# Patient Record
Sex: MALE | Race: WHITE | NOT HISPANIC OR LATINO | Employment: OTHER | URBAN - METROPOLITAN AREA
[De-identification: names, ages, dates, MRNs, and addresses within clinical notes are randomized per-mention and may not be internally consistent; named-entity substitution may affect disease eponyms.]

---

## 2018-06-22 ENCOUNTER — HOME CARE VISIT (OUTPATIENT)
Dept: HOSPICE | Facility: HOSPICE | Age: 83
End: 2018-06-22
Payer: MEDICARE

## 2018-06-22 ENCOUNTER — HOSPITAL ENCOUNTER (OUTPATIENT)
Dept: RADIOLOGY | Facility: MEDICAL CENTER | Age: 83
End: 2018-06-22

## 2018-06-22 ENCOUNTER — HOSPICE ADMISSION (OUTPATIENT)
Dept: HOSPICE | Facility: HOSPICE | Age: 83
End: 2018-06-22
Payer: MEDICARE

## 2018-06-22 ENCOUNTER — APPOINTMENT (OUTPATIENT)
Dept: RADIOLOGY | Facility: MEDICAL CENTER | Age: 83
DRG: 083 | End: 2018-06-22
Attending: EMERGENCY MEDICINE
Payer: MEDICARE

## 2018-06-22 ENCOUNTER — HOSPITAL ENCOUNTER (INPATIENT)
Facility: MEDICAL CENTER | Age: 83
LOS: 3 days | DRG: 083 | End: 2018-06-25
Attending: EMERGENCY MEDICINE | Admitting: HOSPITALIST
Payer: MEDICARE

## 2018-06-22 DIAGNOSIS — I44.2 COMPLETE HEART BLOCK (HCC): ICD-10-CM

## 2018-06-22 DIAGNOSIS — S22.080A T12 COMPRESSION FRACTURE (HCC): ICD-10-CM

## 2018-06-22 DIAGNOSIS — S06.5XAA SDH (SUBDURAL HEMATOMA) (HCC): ICD-10-CM

## 2018-06-22 DIAGNOSIS — I60.9 SUBARACHNOID HEMORRHAGE (HCC): ICD-10-CM

## 2018-06-22 DIAGNOSIS — S06.5XAA SUBDURAL HEMATOMA (HCC): ICD-10-CM

## 2018-06-22 DIAGNOSIS — I49.9 CARDIAC ARRHYTHMIA, UNSPECIFIED CARDIAC ARRHYTHMIA TYPE: ICD-10-CM

## 2018-06-22 PROBLEM — F02.818 LATE ONSET ALZHEIMER'S DISEASE WITH BEHAVIORAL DISTURBANCE (HCC): Chronic | Status: ACTIVE | Noted: 2018-06-22

## 2018-06-22 PROBLEM — G30.1 LATE ONSET ALZHEIMER'S DISEASE WITH BEHAVIORAL DISTURBANCE (HCC): Status: ACTIVE | Noted: 2018-06-22

## 2018-06-22 PROBLEM — G30.1 LATE ONSET ALZHEIMER'S DISEASE WITH BEHAVIORAL DISTURBANCE (HCC): Chronic | Status: ACTIVE | Noted: 2018-06-22

## 2018-06-22 PROBLEM — D69.6 THROMBOCYTOPENIA (HCC): Status: ACTIVE | Noted: 2018-06-22

## 2018-06-22 PROBLEM — I10 ESSENTIAL HYPERTENSION: Chronic | Status: ACTIVE | Noted: 2018-06-22

## 2018-06-22 PROBLEM — E11.9 CONTROLLED TYPE 2 DIABETES MELLITUS WITHOUT COMPLICATION, WITHOUT LONG-TERM CURRENT USE OF INSULIN (HCC): Chronic | Status: ACTIVE | Noted: 2018-06-22

## 2018-06-22 PROBLEM — F02.818 LATE ONSET ALZHEIMER'S DISEASE WITH BEHAVIORAL DISTURBANCE (HCC): Status: ACTIVE | Noted: 2018-06-22

## 2018-06-22 PROBLEM — I10 ESSENTIAL HYPERTENSION: Status: ACTIVE | Noted: 2018-06-22

## 2018-06-22 LAB
ABO GROUP BLD: NORMAL
ABO GROUP BLD: NORMAL
ALBUMIN SERPL BCP-MCNC: 3.9 G/DL (ref 3.2–4.9)
ALBUMIN/GLOB SERPL: 1.5 G/DL
ALP SERPL-CCNC: 61 U/L (ref 30–99)
ALT SERPL-CCNC: 16 U/L (ref 2–50)
ANION GAP SERPL CALC-SCNC: 11 MMOL/L (ref 0–11.9)
APTT PPP: 31.5 SEC (ref 24.7–36)
AST SERPL-CCNC: 28 U/L (ref 12–45)
BARCODED ABORH UBTYP: 5100
BARCODED PRD CODE UBPRD: NORMAL
BARCODED UNIT NUM UBUNT: NORMAL
BILIRUB SERPL-MCNC: 0.8 MG/DL (ref 0.1–1.5)
BLD GP AB SCN SERPL QL: NORMAL
BUN SERPL-MCNC: 44 MG/DL (ref 8–22)
CALCIUM SERPL-MCNC: 8.9 MG/DL (ref 8.5–10.5)
CFT BLD TEG: 4.6 MIN (ref 5–10)
CHLORIDE SERPL-SCNC: 107 MMOL/L (ref 96–112)
CLOT ANGLE BLD TEG: 66.9 DEGREES (ref 53–72)
CLOT LYSIS 30M P MA LENFR BLD TEG: 0 % (ref 0–8)
CO2 SERPL-SCNC: 18 MMOL/L (ref 20–33)
COMPONENT P 8504P: NORMAL
CREAT SERPL-MCNC: 2.91 MG/DL (ref 0.5–1.4)
CT.EXTRINSIC BLD ROTEM: 1.7 MIN (ref 1–3)
ERYTHROCYTE [DISTWIDTH] IN BLOOD BY AUTOMATED COUNT: 43.4 FL (ref 35.9–50)
ETHANOL BLD-MCNC: 0 G/DL
GLOBULIN SER CALC-MCNC: 2.6 G/DL (ref 1.9–3.5)
GLUCOSE BLD-MCNC: 252 MG/DL (ref 65–99)
GLUCOSE SERPL-MCNC: 286 MG/DL (ref 65–99)
HCT VFR BLD AUTO: 32.4 % (ref 42–52)
HGB BLD-MCNC: 10.9 G/DL (ref 14–18)
INR PPP: 1.15 (ref 0.87–1.13)
LACTATE BLD-SCNC: 1.3 MMOL/L (ref 0.5–2)
MCF BLD TEG: 63.7 MM (ref 50–70)
MCH RBC QN AUTO: 31.2 PG (ref 27–33)
MCHC RBC AUTO-ENTMCNC: 33.6 G/DL (ref 33.7–35.3)
MCV RBC AUTO: 92.8 FL (ref 81.4–97.8)
PA AA BLD-ACNC: 80.2 %
PA ADP BLD-ACNC: 48.5 %
PLATELET # BLD AUTO: 121 K/UL (ref 164–446)
PMV BLD AUTO: 10.2 FL (ref 9–12.9)
POTASSIUM SERPL-SCNC: 5.4 MMOL/L (ref 3.6–5.5)
PRODUCT TYPE UPROD: NORMAL
PROT SERPL-MCNC: 6.5 G/DL (ref 6–8.2)
PROTHROMBIN TIME: 14.4 SEC (ref 12–14.6)
RBC # BLD AUTO: 3.49 M/UL (ref 4.7–6.1)
RH BLD: NORMAL
RH BLD: NORMAL
SODIUM SERPL-SCNC: 136 MMOL/L (ref 135–145)
TEG ALGORITHM TGALG: ABNORMAL
TROPONIN I SERPL-MCNC: 0.19 NG/ML (ref 0–0.04)
UNIT STATUS USTAT: NORMAL
WBC # BLD AUTO: 9.1 K/UL (ref 4.8–10.8)

## 2018-06-22 PROCEDURE — 700105 HCHG RX REV CODE 258: Performed by: EMERGENCY MEDICINE

## 2018-06-22 PROCEDURE — 85610 PROTHROMBIN TIME: CPT

## 2018-06-22 PROCEDURE — 86900 BLOOD TYPING SEROLOGIC ABO: CPT

## 2018-06-22 PROCEDURE — 700111 HCHG RX REV CODE 636 W/ 250 OVERRIDE (IP): Performed by: NEUROLOGICAL SURGERY

## 2018-06-22 PROCEDURE — 82962 GLUCOSE BLOOD TEST: CPT

## 2018-06-22 PROCEDURE — 99291 CRITICAL CARE FIRST HOUR: CPT

## 2018-06-22 PROCEDURE — 70450 CT HEAD/BRAIN W/O DYE: CPT

## 2018-06-22 PROCEDURE — 72131 CT LUMBAR SPINE W/O DYE: CPT

## 2018-06-22 PROCEDURE — 80307 DRUG TEST PRSMV CHEM ANLYZR: CPT

## 2018-06-22 PROCEDURE — 770022 HCHG ROOM/CARE - ICU (200)

## 2018-06-22 PROCEDURE — 85384 FIBRINOGEN ACTIVITY: CPT

## 2018-06-22 PROCEDURE — 85027 COMPLETE CBC AUTOMATED: CPT

## 2018-06-22 PROCEDURE — 700105 HCHG RX REV CODE 258: Performed by: HOSPITALIST

## 2018-06-22 PROCEDURE — 85730 THROMBOPLASTIN TIME PARTIAL: CPT

## 2018-06-22 PROCEDURE — 700111 HCHG RX REV CODE 636 W/ 250 OVERRIDE (IP): Performed by: HOSPITALIST

## 2018-06-22 PROCEDURE — P9034 PLATELETS, PHERESIS: HCPCS

## 2018-06-22 PROCEDURE — 86850 RBC ANTIBODY SCREEN: CPT

## 2018-06-22 PROCEDURE — 73070 X-RAY EXAM OF ELBOW: CPT | Mod: LT

## 2018-06-22 PROCEDURE — 85576 BLOOD PLATELET AGGREGATION: CPT | Mod: 91

## 2018-06-22 PROCEDURE — 83605 ASSAY OF LACTIC ACID: CPT

## 2018-06-22 PROCEDURE — G0390 TRAUMA RESPONS W/HOSP CRITI: HCPCS

## 2018-06-22 PROCEDURE — 36430 TRANSFUSION BLD/BLD COMPNT: CPT

## 2018-06-22 PROCEDURE — 99223 1ST HOSP IP/OBS HIGH 75: CPT | Performed by: HOSPITALIST

## 2018-06-22 PROCEDURE — 72128 CT CHEST SPINE W/O DYE: CPT

## 2018-06-22 PROCEDURE — 80053 COMPREHEN METABOLIC PANEL: CPT

## 2018-06-22 PROCEDURE — 71045 X-RAY EXAM CHEST 1 VIEW: CPT

## 2018-06-22 PROCEDURE — 85347 COAGULATION TIME ACTIVATED: CPT

## 2018-06-22 PROCEDURE — 93005 ELECTROCARDIOGRAM TRACING: CPT | Performed by: HOSPITALIST

## 2018-06-22 PROCEDURE — 86901 BLOOD TYPING SEROLOGIC RH(D): CPT

## 2018-06-22 PROCEDURE — A9270 NON-COVERED ITEM OR SERVICE: HCPCS | Performed by: NEUROLOGICAL SURGERY

## 2018-06-22 PROCEDURE — 93010 ELECTROCARDIOGRAM REPORT: CPT | Performed by: INTERNAL MEDICINE

## 2018-06-22 PROCEDURE — 6A550Z2 PHERESIS OF PLATELETS, SINGLE: ICD-10-PCS | Performed by: EMERGENCY MEDICINE

## 2018-06-22 PROCEDURE — 84484 ASSAY OF TROPONIN QUANT: CPT

## 2018-06-22 PROCEDURE — 700102 HCHG RX REV CODE 250 W/ 637 OVERRIDE(OP): Performed by: NEUROLOGICAL SURGERY

## 2018-06-22 PROCEDURE — 700102 HCHG RX REV CODE 250 W/ 637 OVERRIDE(OP): Performed by: HOSPITALIST

## 2018-06-22 RX ORDER — ENEMA 19; 7 G/133ML; G/133ML
1 ENEMA RECTAL
Status: DISCONTINUED | OUTPATIENT
Start: 2018-06-22 | End: 2018-06-22

## 2018-06-22 RX ORDER — SODIUM CHLORIDE 9 MG/ML
INJECTION, SOLUTION INTRAVENOUS CONTINUOUS
Status: DISCONTINUED | OUTPATIENT
Start: 2018-06-22 | End: 2018-06-22

## 2018-06-22 RX ORDER — GLIMEPIRIDE 2 MG/1
2 TABLET ORAL EVERY MORNING
COMMUNITY

## 2018-06-22 RX ORDER — ONDANSETRON 2 MG/ML
4 INJECTION INTRAMUSCULAR; INTRAVENOUS EVERY 4 HOURS PRN
Status: DISCONTINUED | OUTPATIENT
Start: 2018-06-22 | End: 2018-06-25 | Stop reason: HOSPADM

## 2018-06-22 RX ORDER — LORAZEPAM 2 MG/ML
4 INJECTION INTRAMUSCULAR
Status: DISCONTINUED | OUTPATIENT
Start: 2018-06-22 | End: 2018-06-25 | Stop reason: HOSPADM

## 2018-06-22 RX ORDER — CLONIDINE HYDROCHLORIDE 0.1 MG/1
0.1 TABLET ORAL EVERY 4 HOURS PRN
Status: DISCONTINUED | OUTPATIENT
Start: 2018-06-22 | End: 2018-06-22

## 2018-06-22 RX ORDER — HALOPERIDOL 5 MG/ML
2-5 INJECTION INTRAMUSCULAR EVERY 4 HOURS PRN
Status: DISCONTINUED | OUTPATIENT
Start: 2018-06-22 | End: 2018-06-25 | Stop reason: HOSPADM

## 2018-06-22 RX ORDER — SCOLOPAMINE TRANSDERMAL SYSTEM 1 MG/1
1 PATCH, EXTENDED RELEASE TRANSDERMAL
Status: DISCONTINUED | OUTPATIENT
Start: 2018-06-22 | End: 2018-06-22

## 2018-06-22 RX ORDER — OXYCODONE HYDROCHLORIDE 5 MG/1
2.5 TABLET ORAL
Status: DISCONTINUED | OUTPATIENT
Start: 2018-06-22 | End: 2018-06-25 | Stop reason: HOSPADM

## 2018-06-22 RX ORDER — HYDRALAZINE HYDROCHLORIDE 20 MG/ML
10 INJECTION INTRAMUSCULAR; INTRAVENOUS
Status: DISCONTINUED | OUTPATIENT
Start: 2018-06-22 | End: 2018-06-22

## 2018-06-22 RX ORDER — LEVETIRACETAM 500 MG/1
500 TABLET ORAL EVERY 12 HOURS
Status: DISCONTINUED | OUTPATIENT
Start: 2018-06-22 | End: 2018-06-25 | Stop reason: HOSPADM

## 2018-06-22 RX ORDER — MORPHINE SULFATE 4 MG/ML
4 INJECTION, SOLUTION INTRAMUSCULAR; INTRAVENOUS
Status: DISCONTINUED | OUTPATIENT
Start: 2018-06-22 | End: 2018-06-25 | Stop reason: HOSPADM

## 2018-06-22 RX ORDER — POLYETHYLENE GLYCOL 3350 17 G/17G
1 POWDER, FOR SOLUTION ORAL
Status: DISCONTINUED | OUTPATIENT
Start: 2018-06-22 | End: 2018-06-22

## 2018-06-22 RX ORDER — BISACODYL 10 MG
10 SUPPOSITORY, RECTAL RECTAL
Status: DISCONTINUED | OUTPATIENT
Start: 2018-06-22 | End: 2018-06-22

## 2018-06-22 RX ORDER — DIPHENHYDRAMINE HYDROCHLORIDE 50 MG/ML
25 INJECTION INTRAMUSCULAR; INTRAVENOUS EVERY 6 HOURS PRN
Status: DISCONTINUED | OUTPATIENT
Start: 2018-06-22 | End: 2018-06-25 | Stop reason: HOSPADM

## 2018-06-22 RX ORDER — AMOXICILLIN 250 MG
1 CAPSULE ORAL NIGHTLY
Status: DISCONTINUED | OUTPATIENT
Start: 2018-06-22 | End: 2018-06-22

## 2018-06-22 RX ORDER — LABETALOL HYDROCHLORIDE 5 MG/ML
10 INJECTION, SOLUTION INTRAVENOUS
Status: DISCONTINUED | OUTPATIENT
Start: 2018-06-22 | End: 2018-06-22

## 2018-06-22 RX ORDER — DEXAMETHASONE SODIUM PHOSPHATE 4 MG/ML
4 INJECTION, SOLUTION INTRA-ARTICULAR; INTRALESIONAL; INTRAMUSCULAR; INTRAVENOUS; SOFT TISSUE
Status: DISCONTINUED | OUTPATIENT
Start: 2018-06-22 | End: 2018-06-22

## 2018-06-22 RX ORDER — GALANTAMINE HYDROBROMIDE 16 MG/1
16 CAPSULE, EXTENDED RELEASE ORAL
COMMUNITY

## 2018-06-22 RX ORDER — ACETAMINOPHEN 325 MG/1
650 TABLET ORAL EVERY 6 HOURS
Status: DISCONTINUED | OUTPATIENT
Start: 2018-06-22 | End: 2018-06-25 | Stop reason: HOSPADM

## 2018-06-22 RX ORDER — TRAZODONE HYDROCHLORIDE 50 MG/1
50 TABLET ORAL NIGHTLY
COMMUNITY

## 2018-06-22 RX ORDER — HYDRALAZINE HYDROCHLORIDE 20 MG/ML
10-20 INJECTION INTRAMUSCULAR; INTRAVENOUS EVERY 4 HOURS PRN
Status: DISCONTINUED | OUTPATIENT
Start: 2018-06-22 | End: 2018-06-22

## 2018-06-22 RX ORDER — NEBULIZER AND COMPRESSOR
1 EACH MISCELLANEOUS
COMMUNITY

## 2018-06-22 RX ORDER — AMLODIPINE BESYLATE 5 MG/1
5 TABLET ORAL 2 TIMES DAILY
COMMUNITY

## 2018-06-22 RX ORDER — DOCUSATE SODIUM 100 MG/1
100 CAPSULE, LIQUID FILLED ORAL 2 TIMES DAILY
Status: DISCONTINUED | OUTPATIENT
Start: 2018-06-22 | End: 2018-06-22

## 2018-06-22 RX ORDER — ONDANSETRON 4 MG/1
4 TABLET, ORALLY DISINTEGRATING ORAL EVERY 4 HOURS PRN
Status: DISCONTINUED | OUTPATIENT
Start: 2018-06-22 | End: 2018-06-25 | Stop reason: HOSPADM

## 2018-06-22 RX ORDER — AMOXICILLIN 250 MG
1 CAPSULE ORAL
Status: DISCONTINUED | OUTPATIENT
Start: 2018-06-22 | End: 2018-06-22

## 2018-06-22 RX ORDER — AMOXICILLIN 250 MG
2 CAPSULE ORAL 2 TIMES DAILY
Status: DISCONTINUED | OUTPATIENT
Start: 2018-06-22 | End: 2018-06-22

## 2018-06-22 RX ORDER — ONDANSETRON 2 MG/ML
4 INJECTION INTRAMUSCULAR; INTRAVENOUS EVERY 4 HOURS PRN
Status: DISCONTINUED | OUTPATIENT
Start: 2018-06-22 | End: 2018-06-22

## 2018-06-22 RX ORDER — FLUOXETINE 10 MG/1
10 CAPSULE ORAL DAILY
COMMUNITY

## 2018-06-22 RX ORDER — LORAZEPAM 0.5 MG/1
0.5 TABLET ORAL 2 TIMES DAILY PRN
COMMUNITY

## 2018-06-22 RX ORDER — POLYETHYLENE GLYCOL 3350 17 G/17G
1 POWDER, FOR SOLUTION ORAL 2 TIMES DAILY PRN
Status: DISCONTINUED | OUTPATIENT
Start: 2018-06-22 | End: 2018-06-22

## 2018-06-22 RX ORDER — SODIUM BICARBONATE 650 MG/1
650 TABLET ORAL 2 TIMES DAILY
COMMUNITY

## 2018-06-22 RX ORDER — ACETAMINOPHEN 325 MG/1
650 TABLET ORAL EVERY 6 HOURS PRN
Status: DISCONTINUED | OUTPATIENT
Start: 2018-06-22 | End: 2018-06-25 | Stop reason: HOSPADM

## 2018-06-22 RX ORDER — OXYCODONE HYDROCHLORIDE 5 MG/1
5 TABLET ORAL
Status: DISCONTINUED | OUTPATIENT
Start: 2018-06-22 | End: 2018-06-25 | Stop reason: HOSPADM

## 2018-06-22 RX ORDER — ENALAPRILAT 1.25 MG/ML
1.25 INJECTION INTRAVENOUS EVERY 6 HOURS PRN
Status: DISCONTINUED | OUTPATIENT
Start: 2018-06-22 | End: 2018-06-22

## 2018-06-22 RX ADMIN — SODIUM CHLORIDE 1000 ML: 9 INJECTION, SOLUTION INTRAVENOUS at 16:38

## 2018-06-22 RX ADMIN — SODIUM CHLORIDE: 9 INJECTION, SOLUTION INTRAVENOUS at 12:12

## 2018-06-22 RX ADMIN — ACETAMINOPHEN 650 MG: 325 TABLET, FILM COATED ORAL at 17:23

## 2018-06-22 RX ADMIN — LORAZEPAM 4 MG: 2 INJECTION INTRAMUSCULAR; INTRAVENOUS at 21:05

## 2018-06-22 RX ADMIN — SODIUM CHLORIDE: 9 INJECTION, SOLUTION INTRAVENOUS at 14:37

## 2018-06-22 RX ADMIN — LEVETIRACETAM 500 MG: 500 TABLET ORAL at 21:04

## 2018-06-22 RX ADMIN — OXYCODONE HYDROCHLORIDE 5 MG: 5 TABLET ORAL at 16:17

## 2018-06-22 RX ADMIN — HYDRALAZINE HYDROCHLORIDE 10 MG: 20 INJECTION INTRAMUSCULAR; INTRAVENOUS at 17:36

## 2018-06-22 RX ADMIN — MORPHINE SULFATE 4 MG: 4 INJECTION INTRAVENOUS at 21:06

## 2018-06-22 RX ADMIN — LEVETIRACETAM 500 MG: 500 TABLET ORAL at 16:16

## 2018-06-22 ASSESSMENT — PAIN SCALES - GENERAL
PAINLEVEL_OUTOF10: 4
PAINLEVEL_OUTOF10: 9
PAINLEVEL_OUTOF10: 4
PAINLEVEL_OUTOF10: 4
PAINLEVEL_OUTOF10: 6
PAINLEVEL_OUTOF10: 8

## 2018-06-22 ASSESSMENT — PAIN SCALES - WONG BAKER: WONGBAKER_NUMERICALRESPONSE: HURTS EVEN MORE

## 2018-06-22 ASSESSMENT — LIFESTYLE VARIABLES: EVER_SMOKED: NEVER

## 2018-06-22 NOTE — CONSULTS
DATE OF SERVICE:  06/22/2018    CARDIOLOGY CONSULTATION NOTE    REQUESTING PHYSICIAN:  Kodak Sandoval MD, emergency room physician.    REASON FOR CONSULTATION:  Intermittent complete heart block with syncope.    HISTORY OF PRESENT ILLNESS:  The patient is an 87-year-old  male with   history of hypertension, diabetes mellitus, dementia, and prior coronary   bypass surgery who was transferred from [facility] for further   management of intracerebral hemorrhage as well as an intermittent heart block.    Reportedly earlier today he fell and suffered some head trauma.  He was   initially taken to [facility] where he was noted to   intermittently go into complete heart block.  A transvenous temporary   pacemaker was placed via the right internal jugular vein.  He was subsequently   transferred to [facility] where the pacemaker needs to be   repositioned in the cath lab.  He was subsequently found to have subarachnoid   and subdural hematoma and had to be transferred to our facility for   neurosurgery care.    He complained of some back pain and headache, but denies any chest pain or   shortness of breath.    ALLERGIES:  He has no known drug allergies.    HOME MEDICATIONS:  Unclear.    PAST MEDICAL HISTORY: Remarkable for hypertension, diabetes mellitus, dementia, chronic   low back pain, coronary artery disease with prior coronary bypass surgery 4-5   years ago.    PAST SURGICAL HISTORY:  In addition to coronary bypass surgery, also has   multiple prior back surgery.    SOCIAL HISTORY:  His wife passed away about a couple of weeks ago.  He has   been living in nursing home for the last couple of years.  Denied tobacco,   alcoholic or drug use.    FAMILY HISTORY:  No premature coronary artery disease or sudden death.    REVIEW OF SYSTEMS:  Positive for back pain and some headache.  Denies fever,   chills, nausea, vomiting, diarrhea, dysuria, hematuria.  No chest  pain,   palpitation, focal weakness or numbness.  All other systems are negative.    PHYSICAL EXAMINATION:  GENERAL:  Reveals an 87-year-old  male, not in acute distress, not   dyspneic, alert, oriented x3.  VITAL SIGNS:  Blood pressure 143/74, heart rate 100, respiratory rate 18.  HEENT:  Pupils equal, round, react to light and accommodation.  NECK:  Supple.  Temporary pacer in the right internal jugular vein.  No   carotid bruits.  CHEST:  Good expansion.  No rales or wheezing.  Sternotomy scar present.  HEART:  Regular rate and rhythm.  No murmur, rub or gallop.  ABDOMEN:  Soft, no mass or bruits.  EXTREMITIES:  No clubbing, cyanosis or edema.  NEUROLOGIC:  No focal deficit.  SKIN:  No ecchymosis or petechiae.    LABORATORY DATA:  CMP:  Sodium 136, potassium 5.4, BUN 44, creatinine 2.9,   glucose 286, hemoglobin 10.9, and platelets 121.    EKG from another facility by my review showed intermittent complete AV block with  basic sinus rhythm.    CT scan of the brain reportedly showed acute right holohemispheric   subdural hematoma along with left temporal and left occipital lobe,   subarachnoid hemorrhage with dependent hemorrhage with mild right-sided mass   effect with 2.1 mm right to left midline shift along with cerebral atrophy and   white matter lucency consistent with small vessel ischemic changes.    IMPRESSION:  1.  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  The patient reportedly is still very independent despite of the   fact he has been in a nursing home for the last couple of years.  Dr. Jo has spoken to his legal guardian who needs additional time to   decide regarding permanent pacemaker.  2.  Chronic kidney disease stage III.  3.  History of dementia.  4.  History of coronary bypass surgery.  5.  History of hypertension.  6.  Diabetes mellitus.  7.  Chronic low back pain.  8.  Subdural hematoma secondary to ground level fall.    PLAN:   We will leave the transvenous temporary pacemaker in place for now.    We will be contacting the legal guardian regarding long-term plan, especially   pacemaker again tomorrow. Certainly need to avoid beta-blocker or calcium   channel blocker that might exacerbate bradyarrhythmia. Try to obtain additional  record from his cardiologist in [place].  We will follow the patient along with   you. Thank you for allowing us to participate in the care of this patient.       ____________________________________     MD SHEILA VARGAS / ARETHA    DD:  06/22/2018 11:03:05  DT:  06/22/2018 12:35:15    D#:  [id number]  Job#:  [id number]

## 2018-06-22 NOTE — ED NOTES
86 y/o male BIB [facility]   Pt was seen for a fall at [facility]  Transferred to [facility], pacemaker placed

## 2018-06-22 NOTE — PROGRESS NOTES
2 RN assessment completed with ISAIAH Roy. Patient has wounds located left elbow.  Mepilex placed. Wounds dressed with mepitel.  Pictures taken and in chart.  Wound consult ordered.

## 2018-06-22 NOTE — DISCHARGE PLANNING
Medical SW    Referral: Sw received order for hospice.    Es spoke to [facility] Public Guardian, Jorden, for choice. Via phone. She chose [facility].    Es faxed choice to [facility]. Es left  w/ CCA.     Plan: Es to assist w/ d/c planning as needed.

## 2018-06-22 NOTE — DISCHARGE PLANNING
Medical SW    Referral: Sw received VM from ER Es.    Pt's name is Omid Landin.     He has a public guardian in [facility], Jorden Ramirez @ [phone] (see scanned document in EPIC). She wants updates for pt once they are acquired.     Pt also has three daughters.    Sw called Guardian, Jorden. Pt's 2 dtrs are here at bedside: Chiquis and Olivia. Pt was made a DNR. There will be no surgery for a permanent pace maker. Family  on board w/ once pt is no longer A/O, not interacting or waking up then they will shut off his temporary pace maker.    Plan: Sw to assist w/ d/c planning as needed.

## 2018-06-22 NOTE — H&P
Hospital Medicine History & Physical Note    Date of Service  6/22/2018    Primary Care Physician  Unknown at this time    Consultants  Anay Calles MD - cardiology  Sherri Philippe MD - neurosurgery    Code Status  DNAR/DNI in my discussion with the guardian, Velma Ramirez, given his catastrophic event    Chief Complaint  Ground level fall, transferred from [facility] for subdural and subarachnoid bleeds    History of Presenting Illness  87 y.o. male who presented 6/22/2018 with a ground level fall. He was taken to [facility], where he was found to have a complete heart block and an external pacemaker was placed. He was then transferred to [facility] for cardiology; his pacemaker was found to not be working, so it was replaced and a head CT was done, which demonstrated SAH and SDH. He was then transferred to [facility] for neurosurgical consultation. I discussed the case and findings with the patient's daughter and Velma Ramirez, the patient's guardian, who did not want surgical management. In addition, they wanted the patient to be DNAR/DNI per previously discussed wishes for a catastrophic event.     Review of Systems  Review of Systems   Unable to perform ROS: Dementia       Past Medical History  CABG, CAD, alzheimer's    Surgical History   has a past surgical history that includes other cardiac surgery.     Family History  Unknown and unobtainable due to dementia     Social History  Daughter at bedside; lives in a nursing home; no tobacco or alcohol currently    Allergies  No Known Allergies    Medications  Prescriptions Prior to Admission   Medication Sig Dispense Refill Last Dose   • amLODIPine (NORVASC) 5 MG Tab Take 5 mg by mouth 2 Times a Day.   6/21/2018 at 1700   • aspirin EC (ECOTRIN) 81 MG Tablet Delayed Response Take 81 mg by mouth every day.   6/21/2018 at 0800   • FLUoxetine (PROZAC) 10 MG Cap Take 10 mg by mouth every day.   6/21/2018 at 0800   • galantamine (RAZADYNE ER) 16 MG ER capsule Take 16 mg by  mouth every morning with breakfast.   6/21/2018 at 0800   • glimepiride (AMARYL) 2 MG Tab Take 2 mg by mouth every morning.   6/21/2018 at 0800   • Multiple Vitamins-Minerals (SENTRY ADULT) Tab Take 1 Tab by mouth.   6/21/2018 at am   • sodium bicarbonate (SODIUM BICARBONATE) 650 MG Tab Take 650 mg by mouth 2 times a day.   6/21/2018 at 1700   • traZODone (DESYREL) 50 MG Tab Take 50 mg by mouth every evening.   6/21/2018 at 2000   • Cholecalciferol (CVS D3) 2000 UNIT Cap Take  by mouth.   6/21/2018 at 0800   • LORazepam (ATIVAN) 0.5 MG Tab Take 0.5 mg by mouth 2 times a day as needed for Anxiety.   6/8/2018 at unk       Physical Exam  Blood Pressure : 143/74   Temperature: 37 °C (98.6 °F)   Pulse: 100   Respiration: 18   Pulse Oximetry: 100 %     Physical Exam   Constitutional: He appears well-developed.   Elderly, frail, uncomfortable appearing   HENT:   Head: Normocephalic and atraumatic.   Mouth/Throat: Oropharynx is clear and moist.   External pacemaker line in RIGHT IJ   Eyes: Conjunctivae and EOM are normal. Pupils are equal, round, and reactive to light. No scleral icterus.   Pupils equal, slightly sluggish   Neck: Normal range of motion. Neck supple. No tracheal deviation present. No thyromegaly present.   Cardiovascular: Normal rate, regular rhythm, normal heart sounds and intact distal pulses.    No murmur heard.  Pulmonary/Chest: Effort normal and breath sounds normal. No respiratory distress. He has no wheezes. He exhibits tenderness.   Midline sternotomy; external pacer in place, pads in place   Abdominal: Soft. Bowel sounds are normal. He exhibits no distension. There is tenderness.   Musculoskeletal: Normal range of motion. He exhibits no edema or tenderness.   Lymphadenopathy:     He has no cervical adenopathy.        Right: No supraclavicular adenopathy present.        Left: No supraclavicular adenopathy present.   Neurological: He is alert.   Alert, answers some questions, follows simple commands    Skin: Skin is warm and dry.   Vitals reviewed.      Laboratory:  Recent Labs      06/22/18   0953   WBC  9.1   RBC  3.49*   HEMOGLOBIN  10.9*   HEMATOCRIT  32.4*   MCV  92.8   MCH  31.2   MCHC  33.6*   RDW  43.4   PLATELETCT  121*   MPV  10.2     Recent Labs      06/22/18   0953   SODIUM  136   POTASSIUM  5.4   CHLORIDE  107   CO2  18*   GLUCOSE  286*   BUN  44*   CREATININE  2.91*   CALCIUM  8.9     Recent Labs      06/22/18   0953   ALTSGPT  16   ASTSGOT  28   ALKPHOSPHAT  61   TBILIRUBIN  0.8   GLUCOSE  286*     Recent Labs      06/22/18   0953   APTT  31.5   INR  1.15*             No results found for: TROPONINI    Urinalysis:    No results found for: SPECGRAVITY, GLUCOSEUR, KETONES, NITRITE, WBCURINE, RBCURINE, BACTERIA, EPITHELCELL     Imaging:  CT-TSPINE W/O PLUS RECONS   Final Result      No acute fracture or dislocation of the thoracic spine. Degenerative changes.      CT-LSPINE W/O PLUS RECONS   Final Result      Mild to moderate compression fracture of T12 with approximately 40% loss of height. Horizontal fracture is seen through the anterior aspect of the T12 vertebral body.      Moderate compression fracture of L1 has a chronic appearance.      Mild retrolisthesis of L1 on L2 is likely chronic.      Degenerative changes as above described.      Postsurgical changes as above described spanning L2-S1.      Demineralization.      Atherosclerotic plaque.         CT-HEAD W/O   Final Result      1.  Cerebral atrophy.   2.  White matter lucencies most consistent with small vessel ischemic change versus demyelination or gliosis.   3. Acute right holohemispheric subdural hematoma.   4. Left temporal and left occipital lobe subarachnoid hemorrhages and dependent hemorrhage within the occipital horns of the lateral ventricles.   5. Mild right-sided mass effect with 2.1 mm right to left midline shift.      OUTSIDE IMAGES-DX CHEST   Final Result      OUTSIDE IMAGES-CT CERVICAL SPINE   Final Result      OUTSIDE  IMAGES-CT HEAD   Final Result      DX-ELBOW-LIMITED 2- LEFT   Final Result      Examination limited by positioning. No acute fracture identified.   Clinical findings, follow-up imaging would be a consideration.      DX-CHEST-LIMITED (1 VIEW)   Final Result      Mild central pulmonary vascular congestion.      Atherosclerotic plaque.         I personally reviewed the CT images in addition to the radiologist and agree with the findings.     Assessment/Plan:  I anticipate this patient will require at least two midnights for appropriate medical management, necessitating inpatient admission.    * Subarachnoid hemorrhage (HCC)- (present on admission)   Assessment & Plan    Non-operative  Platelets transfused by ER  Jose Martin consulted but family doesn't want operative management now        Subdural hematoma (HCC)- (present on admission)   Assessment & Plan    Same as SAH  No blood thinners  No anti-platelets        Complete heart block (HCC)- (present on admission)   Assessment & Plan    Temporary pacemaker in situ  Discussed with guardian who represents medical decisions for the family; they would not want a permanent pacer.  Cardiology consulting  Trend troponin  Consulting palliative        Controlled type 2 diabetes mellitus without complication, without long-term current use of insulin (HCC)- (present on admission)   Assessment & Plan    Considering critical status and goals of care will hold off on management at this time; palliative care pending        Late onset Alzheimer's disease with behavioral disturbance- (present on admission)   Assessment & Plan    Baseline; palliative care to consult  Reality orientation        Thrombocytopenia (HCC)- (present on admission)   Assessment & Plan    Got platelets per ER physician  Will trend        Essential hypertension- (present on admission)   Assessment & Plan    Will avoid SBP > 160 mmHg; PRN antihypertensives ordered            VTE prophylaxis: SCDs--high risk for  worsening bleeding    I spent a total of 45 minutes of critical care time during this clinical encounter of which > 50% was devoted to counseling and coordinating care including review of records, pertinent lab data and studies, as well as discussing diagnostic evaluation and work up, planned therapeutic interventions and future disposition of care. Where indicated, the assessment and plan reflect discussion of patient with consultants, other healthcare providers, family members, and additional research needed to obtain further information in formulating the plan of care of this patient. This time includes no procedures or overlap with other providers.

## 2018-06-22 NOTE — CONSULTS
DATE OF SERVICE:  06/22/2018    REQUESTING PHYSICIAN:  Kodak Sandoval MD    TYPE OF CONSULTATION:  Neurosurgery.    REASON FOR CONSULTATION:  Closed head injury.    HISTORY OF PRESENT ILLNESS:  This is an 87-year-old male who was transferred   from [facility].  The story is that he had a ground level   fall on 6/22/2018.  He was found to have a complete heart block and had an   external pacemaker placed.  He was transferred to [facility] for cardiology.    He had a CT scan, which showed the hemorrhage, and was transferred to   [facility].    He has a significant and fairly complicated past medical history, which   includes hypertension, diabetes, dementia, chronic back pain, coronary artery   disease, prior coronary artery bypass grafting as well.    ALLERGIES:  He does have dementia.    SOCIAL HISTORY:  He lives in assisted living.  He has a guardian.  He also has   family as well.    PHYSICAL EXAMINATION:  GENERAL:  I reviewed this gentleman, he had a GCS of 14.  He had no weakness   in the arms or legs.  He had minimal headache.  He had no specific complaints.    He was oriented in terms of place, but not year, or date.  He had a stiff   neck as well.  He was nontender in the neck.  NEUROLOGICAL:  Pupils were 3 mm,   equal and reactive.  There were no signs of acute trauma.    IMAGING:  He had a CT scan of the brain.  This shows several things within   several things.  There is a tentorial subdural hemorrhage.  He also has a   right-sided 5 mm acute subdural hematoma.  There is white matter changes to   cerebral atrophy.  There is very minimal shift.    His CT C-spine and T spine are okay.  The CT of the lumbar spine shows   previous fusion with instrumentation from L3 downwards.  He has old   compression fractures of T12.  There is no major stenosis.    IMPRESSION:  This is an 87-year-old male who is on aspirin with abnormal TEG,   who suffered a closed head injury.  At the moment he needs nothing  surgical.    PLAN:  1.  Neuro checks.  2.  One unit of platelet transfusion.  3.  CT scanning in the morning.    We will try to speak to the daughter that at this stage I will try to keep him   away from surgery.       ____________________________________     MD ALYSA RONDON / ARETHA    DD:  06/22/2018 13:55:12  DT:  06/22/2018 14:08:35    D#:  [id number]  Job#:  [id number]

## 2018-06-22 NOTE — ED NOTES
The Medication Reconciliation process has been completed by interviewing the patient and family who report that he is from [facility], a MAR was located in the chart.    Allergies have been reviewed

## 2018-06-22 NOTE — PROGRESS NOTES
Received report and assumed care. Pt transferred to CICU via gurney accompanied by daughter and myself. PT states no pain at this time, bojorquez catheter in place. PT oriented to self but requires reorientation regarding place, time and event - per daughter this is his baseline.

## 2018-06-22 NOTE — ASSESSMENT & PLAN NOTE
Considering critical status and goals of care will hold off on management at this time; palliative care pending

## 2018-06-22 NOTE — DISCHARGE PLANNING
Medical Social Work    MSW received call from bedside RN requesting assistance with getting in touch with pt's guardian. MSW met with Dr. Jo who has spoke to pt's guardian regarding case.    Public Guardian through [facility] Velma Ramirez ([phone]). Velma is the [place] Guardian over pt's case at this time. MSW spoke with Velma. Daughters are allowed at bedside and are all on the same page regarding pt's care and DNR status.     MSW left report for Unit  Tati for follow-up. Guardianship paperwork scanned into Epic. Pt does have advanced directive that is scanned under his real name. Pt's chart is under merge at this time.

## 2018-06-22 NOTE — DISCHARGE PLANNING
Received Choice form at [id number]  Agency/Facility Name: [facility]  Referral sent per Choice form @ [phone]  Choice obtained by JOSEPH Duffy.

## 2018-06-22 NOTE — CONSULTS
Neurosurgery consult    Chief Complaint  Ground level fall, transferred from [facility] for subdural and subarachnoid bleeds     History of Presenting Illness  87 y.o. male who presented 6/22/2018 with a ground level fall. He was taken to [facility], where he was found to have a complete heart block and an external pacemaker was placed. He was then transferred to [facility] for cardiology; his pacemaker was found to not be working, so it was replaced and a head CT was done, which demonstrated SAH and SDH. He was then transferred to [facility] for neurosurgical consultation. When I saw him he had no headache. He is DNR/DNI.       ALLERGIES:  He has no known drug allergies.     HOME MEDICATIONS:  Unclear.     PAST MEDICAL HISTORY:  hypertension, diabetes mellitus, dementia, chronic   low back pain, coronary artery disease with prior coronary bypass surgery 4-5   years ago.     PAST SURGICAL HISTORY:  In addition to coronary bypass surgery, also has   multiple prior back surgery.     SOCIAL HISTORY:  His wife passed away about a couple of weeks ago.  He has   been living in nursing home for the last couple of years.  Denied tobacco,   alcoholic or drug use.     FAMILY HISTORY:  No premature coronary artery disease or sudden death.     REVIEW OF SYSTEMS:  Positive for back pain and some headache.  Denies fever,   chills, nausea, vomiting, diarrhea, dysuria, hematuria.  No chest pain,   palpitation, focal weakness or numbness.  All other systems are negative.    o/e  GCS 14  Confused to date  No weakness arms or legs   PEERL      Ix  CT head:  1.  Cerebral atrophy.  2.  White matter lucencies most consistent with small vessel ischemic change versus demyelination or gliosis.  3. Acute right holohemispheric subdural hematoma.  4. Left temporal and left occipital lobe subarachnoid hemorrhages and dependent hemorrhage within the occipital horns of the lateral ventricles.  5. Mild right-sided mass effect with 2.1 mm right to left  midline shift.      Ct c spine/t spine ok  CT l spine    Mild to moderate compression fracture of T12 with approximately 40% loss of height. Horizontal fracture is seen through the anterior aspect of the T12 vertebral body.    Moderate compression fracture of L1 has a chronic appearance.    Mild retrolisthesis of L1 on L2 is likely chronic.    Degenerative changes as above described.    Postsurgical changes as above described spanning L2-S1.    Demineralization.    Atherosclerotic plaque.    IMP:  [facility]  Significant comorbidities  ASA- inhibited on TEG    PLAN:  No surgery  Platelet transfusion  No blood thinners 1 month  Repeat CT tomorrow  Will speak to daughters- try and keep away from surgery

## 2018-06-23 LAB — EKG IMPRESSION: NORMAL

## 2018-06-23 PROCEDURE — 700111 HCHG RX REV CODE 636 W/ 250 OVERRIDE (IP): Performed by: HOSPITALIST

## 2018-06-23 PROCEDURE — 770022 HCHG ROOM/CARE - ICU (200)

## 2018-06-23 PROCEDURE — 99231 SBSQ HOSP IP/OBS SF/LOW 25: CPT | Performed by: HOSPITALIST

## 2018-06-23 RX ADMIN — LORAZEPAM 4 MG: 2 INJECTION INTRAMUSCULAR; INTRAVENOUS at 08:30

## 2018-06-23 RX ADMIN — LORAZEPAM 4 MG: 2 INJECTION INTRAMUSCULAR; INTRAVENOUS at 02:39

## 2018-06-23 RX ADMIN — LORAZEPAM 4 MG: 2 INJECTION INTRAMUSCULAR; INTRAVENOUS at 20:47

## 2018-06-23 RX ADMIN — MORPHINE SULFATE 4 MG: 4 INJECTION INTRAVENOUS at 02:39

## 2018-06-23 RX ADMIN — MORPHINE SULFATE 4 MG: 4 INJECTION INTRAVENOUS at 20:47

## 2018-06-23 ASSESSMENT — ACTIVITIES OF DAILY LIVING (ADL)
PHYSICAL_TRANSFER_REQUIRES_ASSISTANCE: 1
AMBULATION_REQUIRES_ASSISTANCE: 1
CONTINENCE_REQUIRES_ASSISTANCE: 1
BATHING_REQUIRES_ASSISTANCE: 1
DRESSING_REQUIRES_ASSISTANCE: 1

## 2018-06-23 ASSESSMENT — COPD QUESTIONNAIRES
COPD SCREENING SCORE: 2
DO YOU EVER COUGH UP ANY MUCUS OR PHLEGM?: NO/ONLY WITH OCCASIONAL COLDS OR INFECTIONS
IN THE PAST 12 MONTHS DO YOU DO LESS THAN YOU USED TO BECAUSE OF YOUR BREATHING PROBLEMS: DISAGREE/UNSURE
HAVE YOU SMOKED AT LEAST 100 CIGARETTES IN YOUR ENTIRE LIFE: NO/DON'T KNOW
DURING THE PAST 4 WEEKS HOW MUCH DID YOU FEEL SHORT OF BREATH: NONE/LITTLE OF THE TIME

## 2018-06-23 ASSESSMENT — LIFESTYLE VARIABLES: ALCOHOL_USE: NO

## 2018-06-23 NOTE — DISCHARGE PLANNING
Anticipated Discharge Disposition: D/C to [facility] with Hospice    Action: LSW contacted pt's guardian, Jorden Ramirez (ph# [phone]), to obtain choice for hospice. Pt resides at [facility] and will be returning once hospice is confirmed and arranged.    Barriers to Discharge: Hospice services to be arranged.    Plan: Await return call from guardian for hospice choice.

## 2018-06-23 NOTE — PROGRESS NOTES
Spoke at length with guardian Jorden regarding patient's condition and family's wishes.  Discussed overnight events and plan of care.  Will update Jorden after rounds.

## 2018-06-23 NOTE — ED PROVIDER NOTES
ED Provider Note    CHIEF COMPLAINT  Chief Complaint   Patient presents with   • Trauma Green     Ground level fall at nursing facility       [facility]  [place] is a 87 y.o. male who presents to the emergency department transferred from another hospital with a complex recent medical history.  Apparently the patient fell last night at his nursing home and was taken to his local emergency department where he was noted to have a cardiac arrhythmia felt to be a third-degree heart block in the patient had a right internal jugular Cordis catheter placed and a transvenous pacemaker was placed.  The patient was then transferred to [facility] where it was apparently noted that the pacemaker was not getting good capture so the patient underwent further evaluation and went to the cardiac Cath Lab for repositioning of the pacer and also had a CT scan of the head and was found to have small subdural hematoma and subarachnoid hemorrhage.  The patient was then transferred to this hospital for neurosurgical consultation.  At the time of arrival the patient the patient is complaining of moderate mid back pain..  He has a hard time describing the events of the day.    REVIEW OF SYSTEMS the patient denies headache or chest pain or difficulty breathing.  All other systems negative    PAST MEDICAL HISTORY  History reviewed. No pertinent past medical history.    FAMILY HISTORY  History reviewed. No pertinent family history.    SOCIAL HISTORY  Social History     Social History   • Marital status: Single     Spouse name: N/A   • Number of children: N/A   • Years of education: N/A     Social History Main Topics   • Smoking status: Never Smoker   • Smokeless tobacco: Never Used   • Alcohol use Not on file   • Drug use: Unknown   • Sexual activity: Not on file     Other Topics Concern   • Not on file     Social History Narrative   • No narrative on file       SURGICAL HISTORY  Past Surgical History:   Procedure Laterality Date  "  • OTHER CARDIAC SURGERY         CURRENT MEDICATIONS  Home Medications     Reviewed by Mis Fofana (Pharmacy Tech) on 06/22/18 at 1055  Med List Status: Complete   Medication Last Dose Status   amLODIPine (NORVASC) 5 MG Tab 6/21/2018 Active   aspirin EC (ECOTRIN) 81 MG Tablet Delayed Response 6/21/2018 Active   Cholecalciferol ([facility] D3) 2000 UNIT Cap 6/21/2018 Active   FLUoxetine (PROZAC) 10 MG Cap 6/21/2018 Active   galantamine (RAZADYNE ER) 16 MG ER capsule 6/21/2018 Active   glimepiride (AMARYL) 2 MG Tab 6/21/2018 Active   LORazepam (ATIVAN) 0.5 MG Tab 6/8/2018 Active   Multiple Vitamins-Minerals (SENTRY ADULT) Tab 6/21/2018 Active   sodium bicarbonate (SODIUM BICARBONATE) 650 MG Tab 6/21/2018 Active   traZODone (DESYREL) 50 MG Tab 6/21/2018 Active                ALLERGIES  No Known Allergies    PHYSICAL EXAM  VITAL SIGNS: /69   Pulse (!) 101   Temp 36.5 °C (97.7 °F)   Resp (!) 33   Ht 1.727 m (5' 8\")   Wt 71 kg (156 lb 8.4 oz)   SpO2 (!) 86%   BMI 23.80 kg/m²    Oxygen saturation is interpreted as adequate with supplemental oxygen  Constitutional: Patient is awake he is verbal he does not appear distressed  HENT: Mucous membranes are dry   Eyes: No erythema or discharge or jaundice  Neck: Trachea midline no JVD  Cardiovascular: Regular rate and rhythm  Lungs: Clear and equal bilaterally with no apparent difficulty breathing  Abdomen/Back: Soft nontender nondistended no peritoneal findings  Skin: Warm and dry  Musculoskeletal: No acute bony deformity  Neurologic: Awake verbal moving all extremities    CHART REVIEW  I reviewed the chart materials it was sent with the patient this included laboratory testing showing CBC with a white blood cell count of 8.6 and hemoglobin of 11.7 with basic metabolic panel showing a low bicarb of 16 with an elevated BUN of 44 and an elevated creatinine of 2.99 and an elevated blood sugar of 270.  The troponin is normal at 0.03 LFTs are unremarkable INR was " 1.0 lipase was minimally elevated at 523 in the upper limits of normal on the scale from the transferring hospital was 393 so this is a minimal elevation.  The patient had a chest x-ray showing vascular congestion CT of the cervical spine showing no acute fracture and CT of the head showing a small right subdural hematoma in the frontal parietal area 8 mm in thickness and trace associated subarachnoid hemorrhage.  I also reviewed EKGs please see EKG interpretation below      EKG interpretation  At 5:42 AM the patient had an EKG showing a sinus rhythm 107 bpm S waves noted in lead I and RR R prime in V2 consistent with right bundle branch block pattern there is a left axis deviation there is no pathologic ST elevation.    At 5:48 AM patient had an EKG demonstrating a long sinus pause with no QRS activity although there were P waves noted along the baseline    Radiology  CT-TSPINE W/O PLUS RECONS   Final Result      No acute fracture or dislocation of the thoracic spine. Degenerative changes.      CT-LSPINE W/O PLUS RECONS   Final Result      Mild to moderate compression fracture of T12 with approximately 40% loss of height. Horizontal fracture is seen through the anterior aspect of the T12 vertebral body.      Moderate compression fracture of L1 has a chronic appearance.      Mild retrolisthesis of L1 on L2 is likely chronic.      Degenerative changes as above described.      Postsurgical changes as above described spanning L2-S1.      Demineralization.      Atherosclerotic plaque.         CT-HEAD W/O   Final Result      1.  Cerebral atrophy.   2.  White matter lucencies most consistent with small vessel ischemic change versus demyelination or gliosis.   3. Acute right holohemispheric subdural hematoma.   4. Left temporal and left occipital lobe subarachnoid hemorrhages and dependent hemorrhage within the occipital horns of the lateral ventricles.   5. Mild right-sided mass effect with 2.1 mm right to left midline  shift.      OUTSIDE IMAGES-DX CHEST   Final Result      OUTSIDE IMAGES-CT CERVICAL SPINE   Final Result      OUTSIDE IMAGES-CT HEAD   Final Result      DX-ELBOW-LIMITED 2- LEFT   Final Result      Examination limited by positioning. No acute fracture identified.   Clinical findings, follow-up imaging would be a consideration.      DX-CHEST-LIMITED (1 VIEW)   Final Result      Mild central pulmonary vascular congestion.      Atherosclerotic plaque.               MEDICAL DECISION MAKING and DISPOSITION  In the emergency department IVs were maintained and the patient was placed on a cardiac monitor, cardiology consultation was obtained and the patient was seen by the cardiologist in the emergency department.  Also neurosurgical consultation was obtained.  And since the patient is taking aspirin I have ordered platelets for transfusion.  I reviewed the case with the hospitalist and the patient is admitted to the hospitalist service for further evaluation and treatment    IMPRESSION  1.  Cardiac arrhythmia with transvenous pacemaker placed prior to arrival  2.  Subdural hematoma with subarachnoid hemorrhage  3.  T12 compression fracture  4.  Acute renal failure  5.  Hyperglycemia  6.  Critical care time with this patient is 35 minutes which includes multiple conversations with multiple consultants and bedside care.         Electronically signed by: Kodak Sandoval, 6/23/2018 9:03 AM

## 2018-06-23 NOTE — DISCHARGE PLANNING
Anticipated Discharge Disposition: D/C to Assisted Living with Hospice    Action: LSW spoke with pt's legal guardian, Jorden Hough, and obtained choice for [facility]. Plan is for pt to return to [facility] with services in place. Once hospice has been confirmed, [facility] will need to be contacted to confirm their willingness to have pt return. LSW sent choice form to [facility] for processing.    Barriers to Discharge: Hospice acceptance.    Plan: Await response from [facility].

## 2018-06-23 NOTE — DISCHARGE PLANNING
Received Choice form at 1614  Agency/Facility Name: [facility]  Referral sent per Choice form @ [phone]

## 2018-06-23 NOTE — CARE PLAN
Problem: Safety  Goal: Will remain free from injury  Outcome: PROGRESSING AS EXPECTED  Pt risk factors for impaired skin integrity assessed, Sky score documented in flowsheet. Mepilex placed, pillows in use for support and positioning, Q 2 hr turning and hourly rounding in effect.  Bed in low and locked position.       Problem: Pain Management  Goal: Pain level will decrease to patient's comfort goal  Outcome: PROGRESSING AS EXPECTED

## 2018-06-23 NOTE — PROGRESS NOTES
Hospice RN Yesenia at bedside. Yesenia spoke with caregiver Jorden on phone and all daughters. Jorden wishes for comfort care and family is in agreement. Dr. Pham paged and updated. Orders placed.

## 2018-06-23 NOTE — PROGRESS NOTES
[facility] Hospitalist Progress Note    Date of Service: 2018    Chief Complaint  87 y.o. male admitted 2018 with ground level fall with resultant small subdural and subarrachnoid bleeds that are non surgical per neurosurgery and family decision.  Found to be in complete heart block and started on pacer.      Interval Problem Update  Family made decision for comfort care.    Asleep and resting comfortably  No current distress.  Discussed with RN and am SHEY.    Consultants/Specialty  Neurosurgery  Pulmonary    Disposition  Comfort care.  May transfer to oncology.        Review of Systems   Unable to perform ROS: Acuity of condition      Physical Exam  Laboratory/Imaging   Hemodynamics  Temp (24hrs), Av.4 °C (97.6 °F), Min:36.2 °C (97.1 °F), Max:36.7 °C (98 °F)   Temperature: 36.5 °C (97.7 °F)  Pulse  Av.6  Min: 75  Max: 110 Heart Rate (Monitored): 94  NIBP: 145/57      Respiratory      Respiration: (!) 31, Pulse Oximetry: (!) 86 %        RUL Breath Sounds: Clear, RML Breath Sounds: Clear, RLL Breath Sounds: Diminished, OZZY Breath Sounds: Clear, LLL Breath Sounds: Diminished    Fluids    Intake/Output Summary (Last 24 hours) at 18 1917  Last data filed at 18 1845   Gross per 24 hour   Intake             2170 ml   Output              690 ml   Net             1480 ml       Nutrition  Orders Placed This Encounter   Procedures   • Diet Order Regular     Standing Status:   Standing     Number of Occurrences:   1     Order Specific Question:   Diet:     Answer:   Regular [1]     Physical Exam   Constitutional: He appears well-developed. No distress.   HENT:   Head: Normocephalic.   Nose: Nose normal.   Eyes: Right eye exhibits no discharge. Left eye exhibits no discharge.   Cardiovascular: Normal rate, regular rhythm and normal heart sounds.    Pulmonary/Chest: Effort normal. No respiratory distress. He has no wheezes.   Abdominal: Soft. Bowel sounds are normal. He exhibits no distension.    Musculoskeletal: He exhibits no edema.   Skin: Skin is warm. He is not diaphoretic.   Vitals reviewed.      Recent Labs      06/22/18   0953   WBC  9.1   RBC  3.49*   HEMOGLOBIN  10.9*   HEMATOCRIT  32.4*   MCV  92.8   MCH  31.2   MCHC  33.6*   RDW  43.4   PLATELETCT  121*   MPV  10.2     Recent Labs      06/22/18   0953   SODIUM  136   POTASSIUM  5.4   CHLORIDE  107   CO2  18*   GLUCOSE  286*   BUN  44*   CREATININE  2.91*   CALCIUM  8.9     Recent Labs      06/22/18   0953   APTT  31.5   INR  1.15*                  Assessment/Plan     * Subarachnoid hemorrhage (HCC)- (present on admission)   Assessment & Plan    Non-operative per neurosurgery        Subdural hematoma (HCC)- (present on admission)   Assessment & Plan    Same as SAH  No blood thinners  No anti-platelets        Complete heart block (HCC)- (present on admission)   Assessment & Plan    Temporary pacemaker removed 6/23  Cardiology signing off.        Late onset Alzheimer's disease with behavioral disturbance- (present on admission)   Assessment & Plan    Baseline; palliative care to consult        Thrombocytopenia (HCC)- (present on admission)   Assessment & Plan    Got platelets per ER physician          Quality-Core Measures   Reviewed items::  Radiology images reviewed, Labs reviewed and Medications reviewed  Bangura catheter::  Hospice / Comfort Care

## 2018-06-23 NOTE — PALLIATIVE CARE
PALLIATIVE CARE FOLLOW-UP:    Discussed with ICU RN Tiffanie.  Apparently guardian intends to make choice for [facility] for pt.     Updated: DALY Miller     Plan:  D/c back to [facility] with hospice    Thank you for allowing Palliative Care to support this pt and his family.  Contact [phone] for additional assistance, questions or concerns.

## 2018-06-23 NOTE — CARE PLAN
Problem: Safety  Goal: Will remain free from injury  Outcome: PROGRESSING AS EXPECTED  Pt provided assistance to restroom. Pt provided education on safety, fall prevention, and use of call light. Treaded socks in place, call light within reach, hourly rounding in effect.       Problem: Infection  Goal: Will remain free from infection  Outcome: PROGRESSING AS EXPECTED  Pt assessed for s/s of infection. Standard precautions in effect.  Bangura care provided.

## 2018-06-23 NOTE — PROGRESS NOTES
Spoke with daughter Lynn via telephone, updated her on POC. Lynn states that per her wishes and that of her family they would like the pt sedated and the temporary pacer removed and pt placed on comfort care.  I explained to the daughter that I would inform the health care team of the wishes of her and her family.

## 2018-06-23 NOTE — CARE PLAN
Problem: Safety  Goal: Free from accidental injury  Outcome: PROGRESSING AS EXPECTED    Intervention: Initiate Safety Measures  Bed alarm on. Bed in lowest position and locked. Call light within reach.       Problem: Skin Integrity  Goal: Skin Integrity is maintained  Outcome: PROGRESSING AS EXPECTED    Intervention: Sky Flowsheet  Completed, refer to flowsheets.   Intervention: Turn every 2 hours  Completed.   Intervention: Protect pressure points  Pillows to float heals, pillows under elbows. mepilex in place.

## 2018-06-23 NOTE — PROGRESS NOTES
Cardiology Progress Note               Author: Anay May Date & Time created: 2018  8:15 AM     Interval History:    Temporary pacer has been removed last night  Now on comfort care    Chief Complaint:  Syncope    Review of Systems   Unable to perform ROS: Mental acuity       Physical Exam   Constitutional: No distress.   HENT:   Mouth/Throat: Mucous membranes are normal.   Neck: No JVD present. No tracheal deviation present. No thyroid mass and no thyromegaly present.   Rt IJ no hematoma   Cardiovascular: Normal rate, regular rhythm and intact distal pulses.   Occasional extrasystoles are present.   No murmur heard.  Pulmonary/Chest: Effort normal and breath sounds normal. No respiratory distress. He exhibits no tenderness.   Abdominal: Soft. There is no tenderness.   Musculoskeletal: He exhibits no edema.   Neurological: He is alert. He has normal strength. He displays no tremor.   Skin: Skin is warm and dry. He is not diaphoretic.   Vitals reviewed.      Hemodynamics:  Temp (24hrs), Av.4 °C (97.6 °F), Min:36.1 °C (97 °F), Max:37 °C (98.6 °F)  Temperature: 36.2 °C (97.1 °F)  Pulse  Av.6  Min: 75  Max: 110Heart Rate (Monitored): (!) 105  Blood Pressure : 156/69, NIBP: 145/57     Respiratory:    Respiration: (!) 33, Pulse Oximetry: (!) 85 %        RUL Breath Sounds: Clear, RML Breath Sounds: Clear, RLL Breath Sounds: Diminished, OZZY Breath Sounds: Clear, LLL Breath Sounds: Diminished  Fluids:  Date 18 0700 - 18 0659   Shift 5263-6923 2894-5321 8708-6517 24 Hour Total   I  N  T  A  K  E   Shift Total       O  U  T  P  U  T   Urine 35   35    Shift Total 35   35   Weight (kg) 71 71 71 71       Weight: 71 kg (156 lb 8.4 oz)  GI/Nutrition:  Orders Placed This Encounter   Procedures   • Diet Order Regular     Standing Status:   Standing     Number of Occurrences:   1     Order Specific Question:   Diet:     Answer:   Regular [1]     Lab Results:  Recent Labs      18   0953   WBC   9.1   RBC  3.49*   HEMOGLOBIN  10.9*   HEMATOCRIT  32.4*   MCV  92.8   MCH  31.2   MCHC  33.6*   RDW  43.4   PLATELETCT  121*   MPV  10.2     Recent Labs      06/22/18   0953   SODIUM  136   POTASSIUM  5.4   CHLORIDE  107   CO2  18*   GLUCOSE  286*   BUN  44*   CREATININE  2.91*   CALCIUM  8.9     Recent Labs      06/22/18   0953   APTT  31.5   INR  1.15*         Recent Labs      06/22/18   1622   TROPONINI  0.19*             Medical Decision Making, by Problem:  Active Hospital Problems    Diagnosis   • *Subarachnoid hemorrhage (HCC) [I60.9]   • Complete heart block (HCC) [I44.2]   • Subdural hematoma (HCC) [I62.00]   • Thrombocytopenia (HCC) [D69.6]   • Late onset Alzheimer's disease with behavioral disturbance [G30.1, F02.81]   • Controlled type 2 diabetes mellitus without complication, without long-term current use of insulin (HCC) [E11.9]   • Essential hypertension [I10]       Plan:    Will sign off for now  Please contact us if we could be of further asssistance    Quality-Core Measures

## 2018-06-23 NOTE — HOSPICE
Hospice referral to evaluate for GIP. Met with 2 daughters Chiquis and Olivia at bedside. Also spoke with pt's guardian Velma Ramirez twice via telephone. Pt is awake and alert, converses, pleasantly confused, still eating and drinking. Pt c/o mild headache. Temporary pacemaker set for rate under 80 and is intermittently capturing. Per cardiology note, pt could get a permanent pacemaker tomorrow pending family decision. Neurosurgery following monitoring pt's subdural hematoma. Per notes not a surgical candidate. Repeat CT ordered for tomorrow.      Family was informed that pt would not survive any surgery and they have decided to elect comfort care and remove the temporary pacemaker.     Spoke with on call hospice physician Dr. Vogel. At this time pt does not meet criteria for inpatient hospice as pt is comfortable and has no unmanageable symptoms.  Dr. Vogel recommended family take the night to sleep on things and suggested they speak with cardiology in the morning before making a final decision regarding a permanent pacemaker.  Family upset with information presented to them by hospice. Advised pt may be changed to comfort care under the primary physician and hospice will continue to follow.

## 2018-06-23 NOTE — CONSULTS
"Reason for PC Consult: Advance Care Planning    Consulted by: Terrance Jo MD    Assessment:  General: 88yo gentleman BIB EMS to [facility] from group home s/p GLF, external pacemaker placed for complete heart block, CT imaged SDH/SAH, care flighted and admitted through ED today, 6/22.  Guardian (Jorden Ramirez - [phone]) and dtrs did not want NSG.  PMH: late onset Alzheimer's, CAD s/p CABG, HTN, DM, chronic back pain s/p lumbar fusion    Dyspnea: No-    Last BM:  -    Pain: Yes- 7/10 HA, CICU RN aware  Depression: Unable to determine- pt oriented to self, uses verbal cues to attempt to answer questions appropriately    Spiritual:  Is Hoahaoism or spirituality important for coping with this illness? Yes- possibly would want  \"tomorrow\"  Has a  or spiritual provider visit been requested? No    Palliative Performance Scale: 40    Advanced Directive: None-    DPOA:  -    POLST:No-      Code Status: DNR-      Outcome:  Introduced self and role of Palliative Care.   Dtrs Chiquis and Olivia were not at bedside.  Attempted to assess pt's understanding of his current medical status, overall health picture, and options for future care.  Pt oriented to self and \"in [place],\" denied being in hospital and then said he fell here. Attempted to re-orient to hospital situation.  Pt did not verbalize understanding.    Explored pt's values, beliefs, and preferences in order to identify GOC.  Pt stated that he has four dtrs and gave permission to speak with them.  Pt stated he was a  and this was the cause of his back pain.    Active listening and reflection utilized throughout this encounter.     Discussed with/Updated: [facility] RN    Plan: f/u with dtrs/guardian to determine POC - from IDT, it appears they do not want any intervention and external PM to be removed - thus, comfort care/hospice appropriate.  [facility] choice was made earlier.    Recommendations:  I recommend a " hospice consult.    Thank you for allowing Palliative Care to participate in this patient's care. Please feel free to call [phone] with any questions or concerns.

## 2018-06-24 PROCEDURE — 700111 HCHG RX REV CODE 636 W/ 250 OVERRIDE (IP): Performed by: HOSPITALIST

## 2018-06-24 PROCEDURE — 99231 SBSQ HOSP IP/OBS SF/LOW 25: CPT | Performed by: HOSPITALIST

## 2018-06-24 PROCEDURE — 770022 HCHG ROOM/CARE - ICU (200)

## 2018-06-24 RX ADMIN — LORAZEPAM 4 MG: 2 INJECTION INTRAMUSCULAR; INTRAVENOUS at 07:49

## 2018-06-24 RX ADMIN — MORPHINE SULFATE 4 MG: 4 INJECTION INTRAVENOUS at 07:49

## 2018-06-24 RX ADMIN — MORPHINE SULFATE 4 MG: 4 INJECTION INTRAVENOUS at 19:01

## 2018-06-24 NOTE — PROGRESS NOTES
[facility] Hospitalist Progress Note    Date of Service: 2018    Chief Complaint  87 y.o. male admitted 2018 with ground level fall with resultant small subdural and subarrachnoid bleeds that are non surgical per neurosurgery and family decision.  Found to be in complete heart block and started on pacer.      Interval Problem Update  Comfort care.  Orders written for transfer out of ICU.  Resting comfortably no current needs from ICU RN.  Hospice consultation await further information and accepting group    Consultants/Specialty  Neurosurgery  Pulmonary    Disposition  Comfort care.  May transfer to oncology.        Review of Systems   Unable to perform ROS: Patient unresponsive      Physical Exam  Laboratory/Imaging   Hemodynamics  Temp (24hrs), Av.8 °C (98.2 °F), Min:36.2 °C (97.1 °F), Max:37.2 °C (99 °F)   Temperature: 36.2 °C (97.1 °F)  Pulse  Av.6  Min: 75  Max: 110 Heart Rate (Monitored): 94         Respiratory      Respiration: (!) 27, Pulse Oximetry: 89 %        RUL Breath Sounds: Clear, RML Breath Sounds: Clear, RLL Breath Sounds: Diminished, OZZY Breath Sounds: Clear, LLL Breath Sounds: Diminished    Fluids    Intake/Output Summary (Last 24 hours) at 18 1407  Last data filed at 18 1200   Gross per 24 hour   Intake             1800 ml   Output              825 ml   Net              975 ml       Nutrition  Orders Placed This Encounter   Procedures   • Diet Order Regular     Standing Status:   Standing     Number of Occurrences:   1     Order Specific Question:   Diet:     Answer:   Regular [1]     Physical Exam   Constitutional: He appears well-developed. No distress.   HENT:   Head: Normocephalic.   Nose: Nose normal.   Eyes: Right eye exhibits no discharge. Left eye exhibits no discharge.   Cardiovascular: Normal rate and regular rhythm.    Pulmonary/Chest: Effort normal. No respiratory distress.   Musculoskeletal: He exhibits no edema.   Skin: Skin is warm. He is not diaphoretic.    Vitals reviewed.      Recent Labs      06/22/18   0953   WBC  9.1   RBC  3.49*   HEMOGLOBIN  10.9*   HEMATOCRIT  32.4*   MCV  92.8   MCH  31.2   MCHC  33.6*   RDW  43.4   PLATELETCT  121*   MPV  10.2     Recent Labs      06/22/18   0953   SODIUM  136   POTASSIUM  5.4   CHLORIDE  107   CO2  18*   GLUCOSE  286*   BUN  44*   CREATININE  2.91*   CALCIUM  8.9     Recent Labs      06/22/18   0953   APTT  31.5   INR  1.15*                  Assessment/Plan     * Subarachnoid hemorrhage (HCC)- (present on admission)   Assessment & Plan    Non-operative per neurosurgery        Subdural hematoma (HCC)- (present on admission)   Assessment & Plan    Same as SAH  No blood thinners  No anti-platelets        Complete heart block (HCC)- (present on admission)   Assessment & Plan    Temporary pacemaker removed 6/23  Cardiology signing off.        Late onset Alzheimer's disease with behavioral disturbance- (present on admission)   Assessment & Plan    Baseline; palliative care to consult        Thrombocytopenia (HCC)- (present on admission)   Assessment & Plan    Got platelets per ER physician          Quality-Core Measures   Reviewed items::  Medications reviewed  Bangura catheter::  Hospice / Comfort Care

## 2018-06-24 NOTE — CARE PLAN
Problem: Skin Integrity  Goal: Skin Integrity is maintained  Outcome: PROGRESSING AS EXPECTED    Intervention: Sky Flowsheet  Completed. Refer to flowchart.   Intervention: Turn every 2 hours  Pt was turned and all extremities repositioned Q2.   Intervention: Protect pressure points  Pillows under elbow. Heals floated. mepelix on sacrum.

## 2018-06-24 NOTE — PROGRESS NOTES
Contacted [facility] to see if patient can return on hospice care. Request is  unable to be processed on the weekend, advised to contact facility tomorrow in regards to moving patient back to facility on hospice care.

## 2018-06-24 NOTE — PROGRESS NOTES
Pt family members at bedside. Updated on patient status. Patient received morphine/ativan at 0749 for CPOT of 6. When asked if he was in pain, patient nodded. Patient sleeping calmly since medication administration. Pt mildly responsive to family arrival (turned head, opened eyes).

## 2018-06-24 NOTE — CARE PLAN
Problem: Discharge Barriers/Planning  Goal: Patient's continuum of care needs will be met    Intervention: Assess potential discharge barriers on admission and throughout hospital stay  Awaiting for approval for patient to return to assisted living on hospice.       Problem: Pain Management  Goal: Pain level will decrease to patient's comfort goal    Intervention: Follow pain managment plan developed in collaboration with patient and Interdisciplinary Team  Ativan/Morphine scheduled for comfort care as needed.

## 2018-06-25 ENCOUNTER — HOME CARE VISIT (OUTPATIENT)
Dept: HOSPICE | Facility: HOSPICE | Age: 83
End: 2018-06-25
Payer: MEDICARE

## 2018-06-25 VITALS
HEIGHT: 68 IN | TEMPERATURE: 98 F | OXYGEN SATURATION: 89 % | RESPIRATION RATE: 28 BRPM | BODY MASS INDEX: 24.73 KG/M2 | WEIGHT: 163.14 LBS | SYSTOLIC BLOOD PRESSURE: 156 MMHG | HEART RATE: 91 BPM | DIASTOLIC BLOOD PRESSURE: 69 MMHG

## 2018-06-25 PROCEDURE — 700111 HCHG RX REV CODE 636 W/ 250 OVERRIDE (IP): Performed by: HOSPITALIST

## 2018-06-25 PROCEDURE — 99239 HOSP IP/OBS DSCHRG MGMT >30: CPT | Performed by: HOSPITALIST

## 2018-06-25 RX ADMIN — MORPHINE SULFATE 4 MG: 4 INJECTION INTRAVENOUS at 04:00

## 2018-06-25 RX ADMIN — LORAZEPAM 4 MG: 2 INJECTION INTRAMUSCULAR; INTRAVENOUS at 16:55

## 2018-06-25 RX ADMIN — MORPHINE SULFATE 4 MG: 4 INJECTION INTRAVENOUS at 16:55

## 2018-06-25 RX ADMIN — MORPHINE SULFATE 4 MG: 4 INJECTION INTRAVENOUS at 08:14

## 2018-06-25 ASSESSMENT — PATIENT HEALTH QUESTIONNAIRE - PHQ9
1. LITTLE INTEREST OR PLEASURE IN DOING THINGS: NOT AT ALL
SUM OF ALL RESPONSES TO PHQ9 QUESTIONS 1 AND 2: 0
2. FEELING DOWN, DEPRESSED, IRRITABLE, OR HOPELESS: NOT AT ALL

## 2018-06-25 NOTE — DISCHARGE PLANNING
Medical SW    Referral: Sw spoke to [facility] RN Radha.     Sw received VM from pt's assisted living facility ([facility]). Sw spoke to admissions at [facility], Monica. She indicates they can accept pt back to facility w/ hospice.    Sw called [facility]   Address: [place] [address] Phone: [phone]    The Hospice agency indicates they are not able to accept pt w/ an alias, per face sheet pt's name is [facility]. She is concerned there will be issues w/ Medicare INS AUTH.     Sw spoke to [facility] and they will change his name in EPIC.    Plan: Sw to f/u w/ d/c planning.  Open ? Closes 5PMMonday 9AM-5PM   Tuesday 9AM-5PM   Wednesday 9AM-5PM   Thursday 9AM-5PM   Friday 9AM-5PM   Saturday Closed   Sunday Closed   Suggest an edit    Phone: [phone]

## 2018-06-25 NOTE — DISCHARGE PLANNING
Medical SW    Referral: Sw advised Hospice will have DME hospital bed delivered and set up by 430PM.     Bedside RN reminded hospice RN they will need to provide hard copies of all scripts to the [facility] as well.    Es completed PCS and internal transport form requesting [facility] p/u pt by 4PM. Sw faxed to [facility].    Sw spoke to [facility].    Plan: Sw to assist w/ d/c planning as needed.

## 2018-06-25 NOTE — DISCHARGE PLANNING
Agency/Facility Name: [facility]  Spoke To: [facility]ptionist   Outcome: Attempted to get update, [facility] will call back.

## 2018-06-25 NOTE — CARE PLAN
Problem: Knowledge Deficit  Goal: Knowledge of disease process/condition, treatment plan, diagnostic tests, and medications will improve    Intervention: Explain information regarding disease process/condition, treatment plan, diagnostic tests, and medications and document in education  Comfort care       Problem: Discharge Barriers/Planning  Goal: Patient's continuum of care needs will be met    Intervention: Involve patient and significant other/support system in setting and prioritizing goals for hospital stay and discharge  Family goals for patient is comfort. Patient unable to voice personal goals. Plan to d/c patient with hospice today.

## 2018-06-25 NOTE — DISCHARGE PLANNING
Medical SW    Referral:Sw noted second hospice referral to [facility] when already accepted to [facility].    Es spoke to NGA Leonardo. She states pt will d/c home [facility] in [place] and [facility] does not go out there.    Es [facility] to [facility] on [facility]. Es advised facility RN will call Es back this AM.    Es left skype contact w/ Leida BRADLEY, requesting f/u w/ pending [facility] referral.       Plan: Es to assist w/ d/c planning as needed.

## 2018-06-25 NOTE — DISCHARGE INSTRUCTIONS
Discharge Instructions    Discharged to [facility] by EMS/[facility] . Discharged via ambulance, hospital escort: Yes.  Special equipment needed: Not Applicable    Be sure to schedule a follow-up appointment with your primary care doctor or any specialists as instructed.     Discharge Plan:   Influenza Vaccine Indication: Possibly indicated: Contact facility to determine if vaccine previously given    I understand that a diet low in cholesterol, fat, and sodium is recommended for good health. Unless I have been given specific instructions below for another diet, I accept this instruction as my diet prescription.   Other diet: n/a     Special Instructions: Follow up with hospice       · Is patient discharged on Warfarin / Coumadin?   No     Depression / Suicide Risk    As you are discharged from this [facility] facility, it is important to learn how to keep safe from harming yourself.    Recognize the warning signs:  · Abrupt changes in personality, positive or negative- including increase in energy   · Giving away possessions  · Change in eating patterns- significant weight changes-  positive or negative  · Change in sleeping patterns- unable to sleep or sleeping all the time   · Unwillingness or inability to communicate  · Depression  · Unusual sadness, discouragement and loneliness  · Talk of wanting to die  · Neglect of personal appearance   · Rebelliousness- reckless behavior  · Withdrawal from people/activities they love  · Confusion- inability to concentrate     If you or a loved one observes any of these behaviors or has concerns about self-harm, here's what you can do:  · Talk about it- your feelings and reasons for harming yourself  · Remove any means that you might use to hurt yourself (examples: pills, rope, extension cords, firearm)  · Get professional help from the community (Mental Health, Substance Abuse, psychological counseling)  · Do not be alone:Call your Safe Contact- someone whom  you trust who will be there for you.  · Call your local CRISIS HOTLINE 549-9917 or 326-586-9800  · Call your local Children's Mobile Crisis Response Team [place] (233) 570-3207 or www.[employer]  · Call the toll free National Suicide Prevention Hotlines   · National Suicide Prevention Lifeline 693-210-FEDZ (0114)  · National [employer] Line Network 800-SUICIDE (457-7582)

## 2018-06-25 NOTE — CARE PLAN
Problem: Infection  Goal: Will remain free from infection  Outcome: PROGRESSING SLOWER THAN EXPECTED

## 2018-06-25 NOTE — DISCHARGE PLANNING
Medical SW    Referral: Sw spoke to [facility]. They have accepted pt and will have RN come to hospital to assess pt's DME and medication needs. Sw will set up transport once hospice as advised everything has been delivered.    Plan: Sw to assist w/ d/c planning as needed.

## 2018-06-25 NOTE — DISCHARGE PLANNING
Medical SW    Referral: Es spoke to bedside RN.     Es called [facility] and spoke to Alexander. He states they can p/u pt at 4PM.     Sw advised bedside RN as above.    Plan: Sw to assist w/ d/c planning as needed.

## 2018-06-25 NOTE — PROGRESS NOTES
Spoke with [facility] RN, Kerry, and unit SW. Patient will return to [facility] today as a hospice patient with [facility]. Kerry ordered pt bed and hospice medications. Bed will arrive to [facility] between 1430 and 1630. Patient scheduled to be transported from [facility] at 1600 by [facility].

## 2018-06-26 NOTE — PROGRESS NOTES
Patient discharged with EMS to [facility], [facility] will meet pt at facility. Patient given pain/anxiety medication prior to discharge for transport, see MAR. Patient unable to sign discharge paperwork due to mental status/comfort care. Family and guardian aware of discharge. Original POLST not in pt chart, DNR paper order with EMS transport.

## 2018-06-26 NOTE — HOSPICE
Assessed pt again and spoke with Tati and Dr. Frey about plan of care. Pt appeared comfortable and was receiving  some IV medications for pain and comfort. Plan is to D.C pt back to Facility in [place] which [facility] hospice does not service. Verified with MD if he would be ok to D/C and transfer pt, if facility accepted and he said it would be ok. Did Discuss case with Dr. Jackson and she stated that if pt is not able to D/C that he would Qualify for [facility] hospice. Recommendation was to change him over to SL medications to see if he can tolerate. I conveyed this to .

## 2018-06-26 NOTE — PROGRESS NOTES
Called [facility] and gave updated report on pt to facility along with departure time. Updated Kerry, [facility] RN, that patient left [facility]. Kerry will meet patient at [facility].

## 2018-06-26 NOTE — DISCHARGE SUMMARY
Discharge Summary    CHIEF COMPLAINT ON ADMISSION  Chief Complaint   Patient presents with   • Trauma Green     Ground level fall at nursing facility       Reason for Admission  Trauma Green Tx Fall [facility]     Admission Date  6/22/2018    CODE STATUS  COMFORT CARE    HPI & HOSPITAL COURSE  This is a 87 y.o. male with advanced dementia here with a a ground level fall due to complete heart block.  He subsequently came to the hospital and was found to have a subarachnoid hemorrhage and subdural hematoma.Cardiology & Neurosurgery were consulted.  With discussion of famiy no srugical intervention was recommended.  Family decided on comfort care.  The patient was in no distress.  Confused but pleasant.  Home hospice was arranged and patient transitione to comfort measures per family request.       Therefore, he is discharged in fair and stable condition to hospice.    The patient met 2-midnight criteria for an inpatient stay at the time of discharge.    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 *      FOLLOW UP  No future appointments.  [facility] ([facility])  [address] [address]  [phone]        Lacho Min D.O.  [address] [address]  [phone]          Blanca Jackson M.D.  [address] [id number] [phone]            MEDICATIONS ON DISCHARGE     Medication List      ASK your doctor about these medications       Instructions   amLODIPine 5 MG Tabs  Commonly known as:  NORVASC   Take 5 mg by mouth 2 Times a Day.  Dose:  5 mg     aspirin EC 81 MG Tbec  Commonly known as:  ECOTRIN   Take 81 mg by mouth every day.  Dose:  81 mg     [employer] D3 2000 UNIT Caps  Generic drug:  Cholecalciferol   Take  by mouth.     FLUoxetine 10 MG Caps  Commonly known as:  PROZAC   Take 10 mg by mouth every day.  Dose:  10 mg     galantamine 16 MG ER capsule  Commonly known as:  RAZADYNE ER   Take 16 mg by mouth every morning with breakfast.  Dose:  16 mg     glimepiride 2 MG Tabs  Commonly known as:  AMARYL   Take 2 mg by mouth every morning.  Dose:  2 mg     LORazepam 0.5 MG Tabs  Commonly known as:  ATIVAN   Take 0.5 mg by mouth 2 times a day as needed for Anxiety.  Dose:  0.5 mg     SENTRY ADULT Tabs   Take 1 Tab by mouth.  Dose:  1 Tab     sodium bicarbonate 650 MG Tabs  Commonly known as:  SODIUM BICARBONATE   Take 650 mg by mouth 2 times a day.  Dose:  650 mg     traZODone 50 MG Tabs  Commonly known as:  DESYREL   Take 50 mg by mouth every evening.  Dose:  50 mg            Allergies  No Known Allergies    DIET  No orders of the defined types were placed in this encounter.      ACTIVITY  As tolerated.  Weight bearing as tolerated    CONSULTATIONS  Neurosurgery  Cardiology    PROCEDURES  [facility]    LABORATORY  Lab Results   Component Value Date    SODIUM 136 06/22/2018    POTASSIUM 5.4 06/22/2018    CHLORIDE 107 06/22/2018    CO2 18 (L) 06/22/2018    GLUCOSE 286 (H) 06/22/2018    BUN 44 (H) 06/22/2018    CREATININE 2.91 (H) 06/22/2018        Lab Results   Component Value Date    WBC 9.1 06/22/2018    HEMOGLOBIN 10.9 (L) 06/22/2018    HEMATOCRIT 32.4 (L) 06/22/2018    PLATELETCT 121 (L) 06/22/2018        Total time of the discharge process exceeds 31 minutes.